# Patient Record
Sex: FEMALE | Race: BLACK OR AFRICAN AMERICAN | URBAN - METROPOLITAN AREA
[De-identification: names, ages, dates, MRNs, and addresses within clinical notes are randomized per-mention and may not be internally consistent; named-entity substitution may affect disease eponyms.]

---

## 2024-11-01 ENCOUNTER — NURSING HOME VISIT (OUTPATIENT)
Dept: POST ACUTE CARE | Facility: EXTERNAL LOCATION | Age: 44
End: 2024-11-01
Payer: MEDICAID

## 2024-11-01 DIAGNOSIS — E78.2 MIXED HYPERLIPIDEMIA: ICD-10-CM

## 2024-11-01 DIAGNOSIS — I50.43 ACUTE ON CHRONIC COMBINED SYSTOLIC AND DIASTOLIC CONGESTIVE HEART FAILURE: Primary | ICD-10-CM

## 2024-11-01 DIAGNOSIS — Z79.4 TYPE 2 DIABETES MELLITUS WITH STAGE 3B CHRONIC KIDNEY DISEASE, WITH LONG-TERM CURRENT USE OF INSULIN (MULTI): ICD-10-CM

## 2024-11-01 DIAGNOSIS — D68.61 ANTIPHOSPHOLIPID ANTIBODY SYNDROME (MULTI): ICD-10-CM

## 2024-11-01 DIAGNOSIS — E11.22 TYPE 2 DIABETES MELLITUS WITH STAGE 3B CHRONIC KIDNEY DISEASE, WITH LONG-TERM CURRENT USE OF INSULIN (MULTI): ICD-10-CM

## 2024-11-01 DIAGNOSIS — N18.32 STAGE 3B CHRONIC KIDNEY DISEASE (MULTI): ICD-10-CM

## 2024-11-01 DIAGNOSIS — I10 PRIMARY HYPERTENSION: ICD-10-CM

## 2024-11-01 DIAGNOSIS — N18.32 TYPE 2 DIABETES MELLITUS WITH STAGE 3B CHRONIC KIDNEY DISEASE, WITH LONG-TERM CURRENT USE OF INSULIN (MULTI): ICD-10-CM

## 2024-11-01 PROCEDURE — 99306 1ST NF CARE HIGH MDM 50: CPT | Performed by: FAMILY MEDICINE

## 2024-11-03 ASSESSMENT — ENCOUNTER SYMPTOMS
HEADACHES: 0
CONSTIPATION: 0
CHILLS: 0
COUGH: 0
FEVER: 0
SORE THROAT: 0
ABDOMINAL PAIN: 0
DIARRHEA: 0
SHORTNESS OF BREATH: 0
VOMITING: 0
FATIGUE: 0
DYSURIA: 0
NAUSEA: 0

## 2024-11-08 ENCOUNTER — NURSING HOME VISIT (OUTPATIENT)
Dept: POST ACUTE CARE | Facility: EXTERNAL LOCATION | Age: 44
End: 2024-11-08
Payer: MEDICAID

## 2024-11-08 DIAGNOSIS — E11.22 TYPE 2 DIABETES MELLITUS WITH STAGE 3B CHRONIC KIDNEY DISEASE, WITH LONG-TERM CURRENT USE OF INSULIN (MULTI): Primary | ICD-10-CM

## 2024-11-08 DIAGNOSIS — N18.32 STAGE 3B CHRONIC KIDNEY DISEASE (MULTI): ICD-10-CM

## 2024-11-08 DIAGNOSIS — I10 PRIMARY HYPERTENSION: ICD-10-CM

## 2024-11-08 DIAGNOSIS — Z79.4 TYPE 2 DIABETES MELLITUS WITH STAGE 3B CHRONIC KIDNEY DISEASE, WITH LONG-TERM CURRENT USE OF INSULIN (MULTI): Primary | ICD-10-CM

## 2024-11-08 DIAGNOSIS — N18.32 TYPE 2 DIABETES MELLITUS WITH STAGE 3B CHRONIC KIDNEY DISEASE, WITH LONG-TERM CURRENT USE OF INSULIN (MULTI): Primary | ICD-10-CM

## 2024-11-08 PROCEDURE — 99309 SBSQ NF CARE MODERATE MDM 30: CPT | Performed by: FAMILY MEDICINE

## 2024-11-08 NOTE — LETTER
Patient: Mary Gutierrez  : 1980    Encounter Date: 2024    Subjective  Patient ID: Mary Gutierrez is a 44 y.o. female who is acute skilled care being seen and evaluated for multiple medical problems.    HPI 43 yo admitted here for rehab after stay at [facility] for chf exacerbation. She received iv diuretics and also dx with antiphospholipid syndrome. She is on lifelong coumadin now. She is doing well since arriving. Labs done this week reviewed and show GFR of 38 alkaline phos 221 A1c 9.7 INR 2.6 with normal CBC noted.  Recheck CBC and INR ordered for next week.  GFR overall has been stable if not slightly improved.  She offers no acute complaints or concerns however sugars are running consistently over 200.  Will plan to increase her Lantus dose today.       Review of Systems   Constitutional:  Negative for chills, fatigue and fever.   HENT:  Negative for congestion and sore throat.    Eyes:  Negative for visual disturbance.   Respiratory:  Negative for cough and shortness of breath.    Cardiovascular:  Negative for chest pain.   Gastrointestinal:  Negative for abdominal pain, constipation, diarrhea, nausea and vomiting.   Genitourinary:  Negative for dysuria.   Neurological:  Negative for headaches.       Objective  There were no vitals taken for this visit.    Physical Exam  Vitals reviewed: 98/59 temp 97 pulse 73 weight 137.   Constitutional:       General: She is not in acute distress.     Appearance: Normal appearance.   HENT:      Head: Normocephalic.      Mouth/Throat:      Mouth: Mucous membranes are moist.      Pharynx: Oropharynx is clear.   Eyes:      Extraocular Movements: Extraocular movements intact.      Pupils: Pupils are equal, round, and reactive to light.   Cardiovascular:      Rate and Rhythm: Normal rate and regular rhythm.      Heart sounds: Normal heart sounds.   Pulmonary:      Effort: Pulmonary effort is normal.      Breath sounds: Normal breath sounds. No wheezing or rhonchi.    Abdominal:      General: There is no distension.      Palpations: Abdomen is soft.      Tenderness: There is no abdominal tenderness.   Musculoskeletal:      Right lower leg: No edema.      Left lower leg: No edema.   Lymphadenopathy:      Cervical: No cervical adenopathy.   Skin:     Coloration: Skin is not jaundiced.   Neurological:      Mental Status: She is alert. Mental status is at baseline.      Cranial Nerves: No cranial nerve deficit.   Psychiatric:         Mood and Affect: Mood normal.         Assessment/Plan  Problem List Items Addressed This Visit    None  Visit Diagnoses         Codes    Type 2 diabetes mellitus with stage 3b chronic kidney disease, with long-term current use of insulin (Multi)    -  Primary E11.22, N18.32, Z79.4    Stage 3b chronic kidney disease (Multi)     N18.32    Primary hypertension     I10        Increase Lantus to 14 units daily from 8, monitor sugars and adjust further if needed, recheck CBC CMP with INR next week.  A1c recheck will be due in January   Goals    None           Electronically Signed By: Marcia Garcia MD   11/10/24  3:59 PM

## 2024-11-10 ASSESSMENT — ENCOUNTER SYMPTOMS
FEVER: 0
CONSTIPATION: 0
SHORTNESS OF BREATH: 0
COUGH: 0
FATIGUE: 0
HEADACHES: 0
CHILLS: 0
NAUSEA: 0
SORE THROAT: 0
ABDOMINAL PAIN: 0
DYSURIA: 0
VOMITING: 0
DIARRHEA: 0

## 2024-11-10 NOTE — PROGRESS NOTES
Subjective   Patient ID: Mary Gutierrez is a 44 y.o. female who is acute skilled care being seen and evaluated for multiple medical problems.    HPI 43 yo admitted here for rehab after stay at [facility] for chf exacerbation. She received iv diuretics and also dx with antiphospholipid syndrome. She is on lifelong coumadin now. She is doing well since arriving. Labs done this week reviewed and show GFR of 38 alkaline phos 221 A1c 9.7 INR 2.6 with normal CBC noted.  Recheck CBC and INR ordered for next week.  GFR overall has been stable if not slightly improved.  She offers no acute complaints or concerns however sugars are running consistently over 200.  Will plan to increase her Lantus dose today.       Review of Systems   Constitutional:  Negative for chills, fatigue and fever.   HENT:  Negative for congestion and sore throat.    Eyes:  Negative for visual disturbance.   Respiratory:  Negative for cough and shortness of breath.    Cardiovascular:  Negative for chest pain.   Gastrointestinal:  Negative for abdominal pain, constipation, diarrhea, nausea and vomiting.   Genitourinary:  Negative for dysuria.   Neurological:  Negative for headaches.       Objective   There were no vitals taken for this visit.    Physical Exam  Vitals reviewed: 98/59 temp 97 pulse 73 weight 137.   Constitutional:       General: She is not in acute distress.     Appearance: Normal appearance.   HENT:      Head: Normocephalic.      Mouth/Throat:      Mouth: Mucous membranes are moist.      Pharynx: Oropharynx is clear.   Eyes:      Extraocular Movements: Extraocular movements intact.      Pupils: Pupils are equal, round, and reactive to light.   Cardiovascular:      Rate and Rhythm: Normal rate and regular rhythm.      Heart sounds: Normal heart sounds.   Pulmonary:      Effort: Pulmonary effort is normal.      Breath sounds: Normal breath sounds. No wheezing or rhonchi.   Abdominal:      General: There is no distension.      Palpations: Abdomen  is soft.      Tenderness: There is no abdominal tenderness.   Musculoskeletal:      Right lower leg: No edema.      Left lower leg: No edema.   Lymphadenopathy:      Cervical: No cervical adenopathy.   Skin:     Coloration: Skin is not jaundiced.   Neurological:      Mental Status: She is alert. Mental status is at baseline.      Cranial Nerves: No cranial nerve deficit.   Psychiatric:         Mood and Affect: Mood normal.         Assessment/Plan   Problem List Items Addressed This Visit    None  Visit Diagnoses         Codes    Type 2 diabetes mellitus with stage 3b chronic kidney disease, with long-term current use of insulin (Multi)    -  Primary E11.22, N18.32, Z79.4    Stage 3b chronic kidney disease (Multi)     N18.32    Primary hypertension     I10        Increase Lantus to 14 units daily from 8, monitor sugars and adjust further if needed, recheck CBC CMP with INR next week.  A1c recheck will be due in January   Goals    None

## 2024-11-15 ENCOUNTER — NURSING HOME VISIT (OUTPATIENT)
Dept: POST ACUTE CARE | Facility: EXTERNAL LOCATION | Age: 44
End: 2024-11-15
Payer: MEDICAID

## 2024-11-15 DIAGNOSIS — Z79.4 TYPE 2 DIABETES MELLITUS WITH STAGE 3B CHRONIC KIDNEY DISEASE, WITH LONG-TERM CURRENT USE OF INSULIN (MULTI): ICD-10-CM

## 2024-11-15 DIAGNOSIS — N18.32 STAGE 3B CHRONIC KIDNEY DISEASE (MULTI): ICD-10-CM

## 2024-11-15 DIAGNOSIS — N18.32 TYPE 2 DIABETES MELLITUS WITH STAGE 3B CHRONIC KIDNEY DISEASE, WITH LONG-TERM CURRENT USE OF INSULIN (MULTI): ICD-10-CM

## 2024-11-15 DIAGNOSIS — E11.22 TYPE 2 DIABETES MELLITUS WITH STAGE 3B CHRONIC KIDNEY DISEASE, WITH LONG-TERM CURRENT USE OF INSULIN (MULTI): ICD-10-CM

## 2024-11-15 DIAGNOSIS — I10 PRIMARY HYPERTENSION: ICD-10-CM

## 2024-11-15 DIAGNOSIS — D68.61 ANTIPHOSPHOLIPID ANTIBODY SYNDROME (MULTI): Primary | ICD-10-CM

## 2024-11-15 PROCEDURE — 99309 SBSQ NF CARE MODERATE MDM 30: CPT | Performed by: FAMILY MEDICINE

## 2024-11-15 NOTE — LETTER
Patient: Mary Gutierrez  : 1980    Encounter Date: 11/15/2024    Subjective  Patient ID: Mary Gutierrez is a 44 y.o. female who is acute skilled care being seen and evaluated for multiple medical problems.    HPI 43 yo admitted here for rehab after stay at [facility] for chf exacerbation. She received iv diuretics and also dx with antiphospholipid syndrome. She is on lifelong coumadin now. She is doing well since arriving. Labs done on admission showed GFR of 38 alkaline phosphatase 221 A1c 9.7 with normal CBC.  INR has since increased to 3.0.  Will need to hold the dose today decrease the Coumadin dosing over the weekend and plan for rechecks of CBC BMP and INR on Monday.  We continue to adjust her Lantus dosing as well to control her sugars.         Review of Systems   Constitutional:  Negative for chills, fatigue and fever.   HENT:  Negative for congestion and sore throat.    Eyes:  Negative for visual disturbance.   Respiratory:  Negative for cough and shortness of breath.    Cardiovascular:  Negative for chest pain.   Gastrointestinal:  Negative for abdominal pain, constipation, diarrhea, nausea and vomiting.   Genitourinary:  Negative for dysuria.   Neurological:  Negative for headaches.       Objective  There were no vitals taken for this visit.    Physical Exam  Vitals reviewed: 110/67 temp 97.2 pulse 71 weight 138.   Constitutional:       General: She is not in acute distress.     Appearance: Normal appearance.   HENT:      Head: Normocephalic.      Mouth/Throat:      Mouth: Mucous membranes are moist.      Pharynx: Oropharynx is clear.   Eyes:      Extraocular Movements: Extraocular movements intact.      Pupils: Pupils are equal, round, and reactive to light.   Cardiovascular:      Rate and Rhythm: Normal rate and regular rhythm.      Heart sounds: Normal heart sounds.   Pulmonary:      Effort: Pulmonary effort is normal.      Breath sounds: Normal breath sounds. No wheezing or rhonchi.   Abdominal:       General: There is no distension.      Palpations: Abdomen is soft.      Tenderness: There is no abdominal tenderness.   Musculoskeletal:      Right lower leg: No edema.      Left lower leg: No edema.   Lymphadenopathy:      Cervical: No cervical adenopathy.   Skin:     Coloration: Skin is not jaundiced.   Neurological:      Mental Status: She is alert. Mental status is at baseline.      Cranial Nerves: No cranial nerve deficit.   Psychiatric:         Mood and Affect: Mood normal.         Assessment/Plan  Problem List Items Addressed This Visit    None  Visit Diagnoses         Codes    Antiphospholipid antibody syndrome (Multi)    -  Primary D68.61    Stage 3b chronic kidney disease (Multi)     N18.32    Type 2 diabetes mellitus with stage 3b chronic kidney disease, with long-term current use of insulin (Multi)     E11.22, N18.32, Z79.4    Primary hypertension     I10        Sugars improved with increase Lantus to 14 units, continue to monitor.  Will hold Coumadin today decrease dose to 4 mg for Saturday and Sunday with recheck INR on Monday.  Will recheck BMP as well as CBC with that laboratory study as well.  Plan for recheck of A1c in January but thus far sugars are improved.   Goals    None           Electronically Signed By: Marcia Garcia MD   11/17/24  9:44 PM

## 2024-11-17 ASSESSMENT — ENCOUNTER SYMPTOMS
VOMITING: 0
HEADACHES: 0
DYSURIA: 0
CONSTIPATION: 0
FATIGUE: 0
ABDOMINAL PAIN: 0
SORE THROAT: 0
CHILLS: 0
COUGH: 0
SHORTNESS OF BREATH: 0
NAUSEA: 0
DIARRHEA: 0
FEVER: 0

## 2024-11-18 NOTE — PROGRESS NOTES
Subjective   Patient ID: Mary Gutierrez is a 44 y.o. female who is acute skilled care being seen and evaluated for multiple medical problems.    HPI 43 yo admitted here for rehab after stay at [facility] for chf exacerbation. She received iv diuretics and also dx with antiphospholipid syndrome. She is on lifelong coumadin now. She is doing well since arriving. Labs done on admission showed GFR of 38 alkaline phosphatase 221 A1c 9.7 with normal CBC.  INR has since increased to 3.0.  Will need to hold the dose today decrease the Coumadin dosing over the weekend and plan for rechecks of CBC BMP and INR on Monday.  We continue to adjust her Lantus dosing as well to control her sugars.         Review of Systems   Constitutional:  Negative for chills, fatigue and fever.   HENT:  Negative for congestion and sore throat.    Eyes:  Negative for visual disturbance.   Respiratory:  Negative for cough and shortness of breath.    Cardiovascular:  Negative for chest pain.   Gastrointestinal:  Negative for abdominal pain, constipation, diarrhea, nausea and vomiting.   Genitourinary:  Negative for dysuria.   Neurological:  Negative for headaches.       Objective   There were no vitals taken for this visit.    Physical Exam  Vitals reviewed: 110/67 temp 97.2 pulse 71 weight 138.   Constitutional:       General: She is not in acute distress.     Appearance: Normal appearance.   HENT:      Head: Normocephalic.      Mouth/Throat:      Mouth: Mucous membranes are moist.      Pharynx: Oropharynx is clear.   Eyes:      Extraocular Movements: Extraocular movements intact.      Pupils: Pupils are equal, round, and reactive to light.   Cardiovascular:      Rate and Rhythm: Normal rate and regular rhythm.      Heart sounds: Normal heart sounds.   Pulmonary:      Effort: Pulmonary effort is normal.      Breath sounds: Normal breath sounds. No wheezing or rhonchi.   Abdominal:      General: There is no distension.      Palpations: Abdomen is soft.       Tenderness: There is no abdominal tenderness.   Musculoskeletal:      Right lower leg: No edema.      Left lower leg: No edema.   Lymphadenopathy:      Cervical: No cervical adenopathy.   Skin:     Coloration: Skin is not jaundiced.   Neurological:      Mental Status: She is alert. Mental status is at baseline.      Cranial Nerves: No cranial nerve deficit.   Psychiatric:         Mood and Affect: Mood normal.         Assessment/Plan   Problem List Items Addressed This Visit    None  Visit Diagnoses         Codes    Antiphospholipid antibody syndrome (Multi)    -  Primary D68.61    Stage 3b chronic kidney disease (Multi)     N18.32    Type 2 diabetes mellitus with stage 3b chronic kidney disease, with long-term current use of insulin (Multi)     E11.22, N18.32, Z79.4    Primary hypertension     I10        Sugars improved with increase Lantus to 14 units, continue to monitor.  Will hold Coumadin today decrease dose to 4 mg for Saturday and Sunday with recheck INR on Monday.  Will recheck BMP as well as CBC with that laboratory study as well.  Plan for recheck of A1c in January but thus far sugars are improved.   Goals    None